# Patient Record
Sex: MALE | URBAN - METROPOLITAN AREA
[De-identification: names, ages, dates, MRNs, and addresses within clinical notes are randomized per-mention and may not be internally consistent; named-entity substitution may affect disease eponyms.]

---

## 2022-11-17 ENCOUNTER — ATHLETIC TRAINING SESSION (OUTPATIENT)
Dept: SPORTS MEDICINE | Facility: CLINIC | Age: 22
End: 2022-11-17

## 2022-11-17 DIAGNOSIS — M62.89 HERNIA OF MUSCLE THROUGH FASCIA OF LOWER LEG: Primary | ICD-10-CM

## 2022-11-17 NOTE — PROGRESS NOTES
Subjective:          Chief Complaint: Sara Palma is a 21 y.o. male student at [employer] who had concerns including Inguinal Hernia and Injury of the Left Hip.    Athlete has been dealing with a repetitive hip pain along with a adductor strain. The athlete had been doing rehab and returned to jumping with no pain however, after increasing his stride length and energy output the pain returned. This kept happening for the last 3 weeks. The athlete has been shut down until after durga break. The athlete saw  last night where he ruled out a labrum tear and any impingement issues.  has referred the athlete to  to rule out a inguinal hernia and for further evaluation.       Injury  Inguinal Hernia    ROS                Objective:        General: Sara is well-developed, well-nourished, appears stated age, in no acute distress, alert and oriented to time, place and person.     AT Session            Assessment:       Inguinal Hernia            Plan:         1. Athlete is seeing  to rule out a Inguinal Hernia    2. Physician Referral: yes  3. ED Referral: yes  4. Parent/Guardian Notified: No  5. All questions were answered, ath. will contact me for questions or concerns in  the interim.  6.         Eligible to use School Insurance: Yes

## 2022-12-15 ENCOUNTER — ATHLETIC TRAINING SESSION (OUTPATIENT)
Dept: SPORTS MEDICINE | Facility: CLINIC | Age: 22
End: 2022-12-15

## 2022-12-15 NOTE — PROGRESS NOTES
Athlete received MRI results from  showing no evidence of a sports hernia. Showing asymmetric T1 Hyposensitivity of the left pubic symphysis body. Tear versus avulsion fracture of the left hamstring tendon origin. Plan to return from [employer] January 5th and have a follow up with  to discuss further steps.

## 2023-01-02 ENCOUNTER — ATHLETIC TRAINING SESSION (OUTPATIENT)
Dept: SPORTS MEDICINE | Facility: CLINIC | Age: 23
End: 2023-01-02

## 2023-01-02 NOTE — PROGRESS NOTES
"Athlete states on December 22, that he got to see a "hip expert" back home in [place]. Stating that our previous MRI findings are from a old injury that he sustained in march. The person he has been working with states the bone fracture appearing on our MRI is healed. The athlete also states the PT he has been working with has found that his hip is inward rotated which they believe is the cause of the current pain. The athlete says that the exercises he was prescribed for this inward rotatted hip have been helping a lot and would like to avoid surgery with our team doctors. I will await paperwork on stating the previously mentioned prior to releasing the athlete to jump at any practices or competitions this upcoming season.  "